# Patient Record
Sex: MALE | Race: WHITE | URBAN - METROPOLITAN AREA
[De-identification: names, ages, dates, MRNs, and addresses within clinical notes are randomized per-mention and may not be internally consistent; named-entity substitution may affect disease eponyms.]

---

## 2017-04-21 ENCOUNTER — THERAPY VISIT (OUTPATIENT)
Dept: PHYSICAL THERAPY | Facility: CLINIC | Age: 47
End: 2017-04-21
Payer: COMMERCIAL

## 2017-04-21 ENCOUNTER — OFFICE VISIT (OUTPATIENT)
Dept: ORTHOPEDICS | Facility: CLINIC | Age: 47
End: 2017-04-21
Payer: COMMERCIAL

## 2017-04-21 VITALS
HEIGHT: 75 IN | DIASTOLIC BLOOD PRESSURE: 79 MMHG | BODY MASS INDEX: 22.75 KG/M2 | SYSTOLIC BLOOD PRESSURE: 120 MMHG | WEIGHT: 183 LBS

## 2017-04-21 DIAGNOSIS — S39.011A ABDOMINAL MUSCLE STRAIN, INITIAL ENCOUNTER: Primary | ICD-10-CM

## 2017-04-21 DIAGNOSIS — S30.1XXA HEMATOMA OF GROIN, INITIAL ENCOUNTER: ICD-10-CM

## 2017-04-21 DIAGNOSIS — S76.219A GROIN STRAIN: Primary | ICD-10-CM

## 2017-04-21 DIAGNOSIS — S39.011A ABDOMINAL MUSCLE STRAIN: ICD-10-CM

## 2017-04-21 PROCEDURE — 99203 OFFICE O/P NEW LOW 30 MIN: CPT | Performed by: FAMILY MEDICINE

## 2017-04-21 PROCEDURE — 97161 PT EVAL LOW COMPLEX 20 MIN: CPT | Mod: GP | Performed by: PHYSICAL THERAPIST

## 2017-04-21 RX ORDER — TADALAFIL 5 MG/1
5 TABLET ORAL
COMMUNITY
Start: 2016-12-28

## 2017-04-21 NOTE — PROGRESS NOTES
[facility]   Clinic Visit s Apr 21, 2017    PCP: No primary care provider on file.      Anil is a 46 year old male who is seen in consultation at the request of Freeman Bates for   Chief Complaint   Patient presents with     Musculoskeletal Problem     lower abdominal/groin pain     Injury: he was jumping up for a basketball on 4/17/17 and felt a sudden pain and electrical sensation in the groin and testicular region    Location of Pain: bilateral groin and lower abdominal pain, worse on the left  Duration of Pain: 4 day(s)  Rating of Pain at worst: 10/10  Rating of Pain Currently: 2/10  Pain is worse with: getting in and out of bed, coughing, sneezing  Pain is better with: walking  Treatment so far consists of: ice, ibuprofen and chiropractic care   Associated symptoms: + swelling and bruising in the groin, penis and testes  Prior History of related problems: testicular cancer- left testicle removed (had follow up CT this past march and is cancer free), previous surgeries to help have children and vasectomy     Social History: works as a consultant and also teaches business consulting at [employer]     Past Medical History:   Diagnosis Date     Herpes simplex without mention of complication      Testicular cancer (H)      Unspecified sinusitis (chronic)        Patient Active Problem List    Diagnosis Date Noted     Pain in joint, ankle and foot 02/27/2015     Priority: Medium     CARDIOVASCULAR SCREENING; LDL GOAL LESS THAN 160 10/31/2010     Priority: Medium     Herpes simplex virus (HSV) infection      Priority: Medium     Problem list name updated by automated process. Provider to review       FMHX denies sig illnesses.      Social History     Social History     Marital status:      Spouse name: N/A     Number of children: N/A     Years of education: N/A     Social History Main Topics     Smoking status: Never Smoker     Smokeless tobacco: None     Alcohol use Yes  "     Comment: 6-7 beers / week     Past Surgical History:   Procedure Laterality Date     ORCHIECTOMY SCROTAL Left      SURGICAL HISTORY OF -       s/p excision vericose seal on groin     SURGICAL HISTORY OF -       cleft palate repair     VASECTOMY         Review of Systems   Musculoskeletal: Positive for joint pain.   All other systems reviewed and are negative.        Physical Exam   Abdominal: Soft. Bowel sounds are normal. He exhibits mass. He exhibits no distension. There is tenderness. There is no rebound and no guarding.   Genitourinary: Penis normal.   Genitourinary Comments: Diffuse scrotal bruising, no scrotal tenderness to palpation or masses.    Golf ball sized hematomas present just above pubic symphysis bilaterally, + tenderness to palpation , bruising present, no induration, purulence, unusual warmth   Musculoskeletal:        Right hip: Normal.        Left hip: Normal.     /79 (BP Location: Left arm, Patient Position: Chair, Cuff Size: Adult Regular)  Ht 6' 2.5\" (1.892 m)  Wt 183 lb (83 kg)  BMI 23.18 kg/m2  Constitutional:well-developed, well-nourished, and in no distress.   Cardiovascular: Intact distal pulses.    Neurological: alert. Gait Normal:   Gait, station, stance, and balance appear normal for age  Skin: Skin is warm and dry.   Psychiatric: Mood and affect normal.   Respiratory: unlabored, speaks in full sentences  Lymph: no LAD, no lymphangitis    Left Hip Exam   Left hip exam is normal.    Tenderness   None    Range of Motion   Normal left hip ROM    Muscle Strength   Normal left hip strength    Comments:  Mild nontender bruising into anteromedial thigh    Right Hip Exam   Right hip exam is normal.    Tenderness   None    Range of Motion   Normal right hip ROM    Muscle Strength   Normal right hip strength        ASSESSMENT/PLAN    ICD-10-CM    1. Abdominal muscle strain, initial encounter S39.011A    2. Hematoma of groin, initial encounter S30.1XXA      Acute abd strain, " probably rectus, with secondary hematomas and scrotal bruising. Advised on compression, icing, and active rest. Once pain starts to recede, brief application of heat to mobilize hematomas recommended. Cannot exclude possible sports hernia (tear of transversalis fascia) as a consequence, recheck in 2-3 weeks as needed.

## 2017-04-21 NOTE — PROGRESS NOTES
Subjective:    Patient is a 46 year old male presenting with rehab right hip hpi and rehab left hip hpi.   Anil Yo is a 46 year old male with a bilateral hips (L>R) condition.  Condition occurred with:  A jump.  Condition occurred: during recreation/sport.  This is a new condition  4/17/17.    Site of Pain: groin, adductors, lower rectus femoris.    Pain is described as sharp and burning and is constant and reported as 5/10.  Associated symptoms:  Edema, loss of motion/stiffness and loss of strength. Pain is the same all the time.  Symptoms are exacerbated by activity, bending/squatting, standing and running and relieved by rest.  Since onset symptoms are unchanged.        General health as reported by patient is good.  Pertinent medical history includes:  Cancer (testicular cancer, currently is stable).  Medical allergies: yes (CT dye, z-pack).  Other surgeries include:  Cancer surgery.    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  Primary job tasks include:  Prolonged sitting.    Barriers include:  None as reported by the patient.    Red flags:  None as reported by the patient.                        Objective:    System                                           Hip Evaluation  HIP AROM:  : limited due to pain.                  Hip PROM:  : limited due to pain.                          Hip Strength:  : Not tested due to acute injury and pain, adductors 3/5.                            Hip Palpation:  Palpations normal left hip: very tender at lower rectus femoris and around genital area.  Left hip tenderness present at:   Adductors and Pubis  Right hip tenderness present at:  Adductors and Pubis                 General Evaluation:                Edema:    Significant findings:  Bilateral edema proximal to the scrotum (Left > Right)                                                   ROS    Assessment/Plan:      Patient is a 46 year old male with  bilateral groin/adductor complaints.    Patient has the following significant findings with corresponding treatment plan.                Diagnosis 1:  Bilateral adductor strain  Pain -  hot/cold therapy, electric stimulation, manual therapy, self management, education and home program  Decreased ROM/flexibility - manual therapy, therapeutic exercise and home program  Decreased strength - therapeutic exercise, therapeutic activities and home program  Edema - electric stimulation, cold therapy and self management/home program      Therapy Evaluation Codes:   1) History comprised of:   Personal factors that impact the plan of care:      None.    Comorbidity factors that impact the plan of care are:      Cancer.     Medications impacting care: None.  2) Examination of Body Systems comprised of:   Body structures and functions that impact the plan of care:      Bilateral adductors, groin.   Activity limitations that impact the plan of care are:      Jumping, Running, Sports, Squatting/kneeling, Stairs and Standing.  3) Clinical presentation characteristics are:   Stable/Uncomplicated.  4) Decision-Making    Low complexity using standardized patient assessment instrument and/or measureable assessment of functional outcome.  Cumulative Therapy Evaluation is: Low complexity.    Previous and current functional limitations:  (See Goal Flow Sheet for this information)    Short term and Long term goals: (See Goal Flow Sheet for this information)     Communication ability:  Patient appears to be able to clearly communicate and understand verbal and written communication and follow directions correctly.  Treatment Explanation - The following has been discussed with the patient:   RX ordered/plan of care  Anticipated outcomes  Possible risks and side effects  This patient would benefit from PT intervention to resume normal activities.   Rehab potential is good.    Frequency:  1 X week, once daily  Duration:  for 8 weeks  Discharge  Plan:  Achieve all LTG.  Independent in home treatment program.  Reach maximal therapeutic benefit.    Please refer to the daily flowsheet for treatment today, total treatment time and time spent performing 1:1 timed codes.

## 2017-04-21 NOTE — MR AVS SNAPSHOT
After Visit Summary   4/21/2017    Anil Yo    MRN: [id number]           Patient Information     Date Of Birth          1970        Visit Information        Provider Department      4/21/2017 9:20 AM Sam Bates PT [facility]        Today's Diagnoses     Groin strain    -  1    Abdominal muscle strain           Follow-ups after your visit        Who to contact     If you have questions or need follow up information about today's clinic visit or your schedule please contact [facility] directly at [phone].  Normal or non-critical lab and imaging results will be communicated to you by Transporeonhart, letter or phone within 4 business days after the clinic has received the results. If you do not hear from us within 7 days, please contact the clinic through Voradiust or phone. If you have a critical or abnormal lab result, we will notify you by phone as soon as possible.  Submit refill requests through NativeAD or call your pharmacy and they will forward the refill request to us. Please allow 3 business days for your refill to be completed.          Additional Information About Your Visit        MyChart Information     NativeAD gives you secure access to your electronic health record. If you see a primary care provider, you can also send messages to your care team and make appointments. If you have questions, please call your primary care clinic.  If you do not have a primary care provider, please call [phone] and they will assist you.        Care EveryWhere ID     This is your Care EveryWhere ID. This could be used by other organizations to access your [place] medical records  [id number]         Blood Pressure from Last 3 Encounters:   04/21/17 120/79   12/19/08 115/65   03/04/08 105/60    Weight from Last 3 Encounters:   04/21/17 83 kg (183 lb)   12/19/08 81.6 kg (180 lb)   03/04/08  82.3 kg (181 lb 6.4 oz)              We Performed the Following     HC PT EVAL, LOW COMPLEXITY     ROLY INITIAL EVAL REPORT        Primary Care Provider    None Specified       No primary provider on file.        Thank you!     Thank you for choosing [facility]  for your care. Our goal is always to provide you with excellent care. Hearing back from our patients is one way we can continue to improve our services. Please take a few minutes to complete the written survey that you may receive in the mail after your visit with us. Thank you!             Your Updated Medication List - Protect others around you: Learn how to safely use, store and throw away your medicines at www.disposemymeds.org.          This list is accurate as of: 4/21/17  3:46 PM.  Always use your most recent med list.                   Brand Name Dispense Instructions for use    CENTRUM Tabs      Reported on 4/21/2017       CIALIS 5 MG tablet   Generic drug:  tadalafil      Take 5 mg by mouth       MEDROL (QUE) 4 MG tablet   Generic drug:  methylPREDNISolone     21    AS DIRECTED       VALTREX 1000 mg tablet   Generic drug:  valACYclovir     16    1 TABLET 3 TIMES DAILY       VIAGRA 100 MG cap/tab   Generic drug:  sildenafil     12    1/2 - 1 TABLET TAKEN AT LEAST 30 MINUTES BEFORE INTERCOURSE       VICODIN 5-500 MG per tablet   Generic drug:  HYDROcodone-acetaminophen     15    ONE TO TWO TABLETS EVERY 4 TO 6 HOURS AS NEEDED FOR PAIN       ZITHROMAX Z- MG Caps     6    2 tabs p.o day#1 then 1 tab p.o. qd x 4days

## 2017-04-21 NOTE — LETTER
[facility] [place] [id number] [facility] [address] [facility]  [address]  [facility] [place] [facility] [address]  [phone]    2017    Re: Anil oY   :   1970  MRN:  [id number]   REFERRING PHYSICIAN:   Trenton Williamson    [facility]  Date of Initial Evaluation:     17  Visits:  Rxs Used: 1  Reason for Referral:     Groin strain  Abdominal muscle strain    EVALUATION SUMMARY  Subjective:  Patient is a 46 year old male presenting with rehab right hip hpi and rehab left hip hpi.   Anil Yo is a 46 year old male with a bilateral hips (L>R) condition.  Condition occurred with:  A jump.  Condition occurred: during recreation/sport.  This is a new condition  17.    Site of Pain: groin, adductors, lower rectus femoris.    Pain is described as sharp and burning and is constant and reported as 5/10.  Associated symptoms:  Edema, loss of motion/stiffness and loss of strength. Pain is the same all the time.  Symptoms are exacerbated by activity, bending/squatting, standing and running and relieved by rest.  Since onset symptoms are unchanged.        General health as reported by patient is good.  Pertinent medical history includes:  Cancer (testicular cancer, currently is stable). Medical allergies: yes (CT dye, z-pack).  Other surgeries include:  Cancer surgery.    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  Primary job tasks include:  Prolonged sitting.  Barriers include:  None as reported by the patient.  Red flags:  None as reported by the patient.    Objective:  Hip Evaluation  HIP AROM:  : limited due to pain.  Hip PROM:  : limited due to pain.  Hip Strength:  : Not tested due to acute injury and pain, adductors 3/5.  Hip Palpation:  Palpations normal left hip: very tender at lower rectus femoris and around genital area.  Left hip tenderness present at:   Adductors  and Pubis  Right hip tenderness present at:  Adductors and Pubis         General Evaluation:  Edema:    Significant findings:  Bilateral edema proximal to the scrotum (Left > Right)    Re: Anil Yo   :   1970    Assessment/Plan:    Patient is a 46 year old male with bilateral groin/adductor complaints.    Patient has the following significant findings with corresponding treatment plan.                Diagnosis 1:  Bilateral adductor strain  Pain -  hot/cold therapy, electric stimulation, manual therapy, self management, education and home program  Decreased ROM/flexibility - manual therapy, therapeutic exercise and home program  Decreased strength - therapeutic exercise, therapeutic activities and home program  Edema - electric stimulation, cold therapy and self management/home program      Therapy Evaluation Codes:   1) History comprised of:   Personal factors that impact the plan of care:      None.    Comorbidity factors that impact the plan of care are:      Cancer.     Medications impacting care: None.  2) Examination of Body Systems comprised of:   Body structures and functions that impact the plan of care:      Bilateral adductors, groin.   Activity limitations that impact the plan of care are:      Jumping, Running, Sports, Squatting/kneeling, Stairs and Standing.  3) Clinical presentation characteristics are:   Stable/Uncomplicated.  4) Decision-Making    Low complexity using standardized patient assessment instrument and/or measureable assessment of functional outcome.  Cumulative Therapy Evaluation is: Low complexity.    Previous and current functional limitations:  (See Goal Flow Sheet for this information)    Short term and Long term goals: (See Goal Flow Sheet for this information)     Communication ability:  Patient appears to be able to clearly communicate and understand verbal and written communication and follow directions correctly.  Treatment Explanation - The following has been  discussed with the patient:   RX ordered/plan of care  Anticipated outcomes  Possible risks and side effects  This patient would benefit from PT intervention to resume normal activities.   Rehab potential is good.    Frequency:  1 X week, once daily  Duration:  for 8 weeks  Discharge Plan:  Achieve all LTG.  Independent in home treatment program.  Reach maximal therapeutic benefit.    Re: Anil Yo   :   1970      Thank you for your referral.    INQUIRIES  Therapist:    Sam Bates, PT  [facility]  [address] [place] [address]  [address]  [place] [address]  Phone: [phone]  Fax: [phone]

## 2017-04-21 NOTE — MR AVS SNAPSHOT
"              After Visit Summary   4/21/2017    Anil Yo    MRN: [id number]           Patient Information     Date Of Birth          1970        Visit Information        Provider Department      4/21/2017 10:20 AM Allen Villarreal MD [facility]        Today's Diagnoses     Abdominal muscle strain, initial encounter    -  1    Hematoma of groin, initial encounter           Follow-ups after your visit        Who to contact     If you have questions or need follow up information about today's clinic visit or your schedule please contact [facility] directly at [phone].  Normal or non-critical lab and imaging results will be communicated to you by MyChart, letter or phone within 4 business days after the clinic has received the results. If you do not hear from us within 7 days, please contact the clinic through P[employer] Pharm[employer]shart or phone. If you have a critical or abnormal lab result, we will notify you by phone as soon as possible.  Submit refill requests through Sedicii or call your pharmacy and they will forward the refill request to us. Please allow 3 business days for your refill to be completed.          Additional Information About Your Visit        MyChart Information     Sedicii gives you secure access to your electronic health record. If you see a primary care provider, you can also send messages to your care team and make appointments. If you have questions, please call your primary care clinic.  If you do not have a primary care provider, please call [phone] and they will assist you.        Care EveryWhere ID     This is your Care EveryWhere ID. This could be used by other organizations to access your [place] medical records  [id number]        Your Vitals Were     Height BMI (Body Mass Index)                6' 2.5\" (1.892 m) 23.18 kg/m2           Blood Pressure from Last 3 Encounters: "   04/21/17 120/79   12/19/08 115/65   03/04/08 105/60    Weight from Last 3 Encounters:   04/21/17 183 lb (83 kg)   12/19/08 180 lb (81.6 kg)   03/04/08 181 lb 6.4 oz (82.3 kg)              Today, you had the following     No orders found for display       Primary Care Provider    None Specified       No primary provider on file.        Thank you!     Thank you for choosing [facility]  for your care. Our goal is always to provide you with excellent care. Hearing back from our patients is one way we can continue to improve our services. Please take a few minutes to complete the written survey that you may receive in the mail after your visit with us. Thank you!             Your Updated Medication List - Protect others around you: Learn how to safely use, store and throw away your medicines at www.disposemymeds.org.          This list is accurate as of: 4/21/17 11:59 PM.  Always use your most recent med list.                   Brand Name Dispense Instructions for use    CENTRUM Tabs      Reported on 4/21/2017       CIALIS 5 MG tablet   Generic drug:  tadalafil      Take 5 mg by mouth       MEDROL (QUE) 4 MG tablet   Generic drug:  methylPREDNISolone     21    AS DIRECTED       VALTREX 1000 mg tablet   Generic drug:  valACYclovir     16    1 TABLET 3 TIMES DAILY       VIAGRA 100 MG cap/tab   Generic drug:  sildenafil     12    1/2 - 1 TABLET TAKEN AT LEAST 30 MINUTES BEFORE INTERCOURSE       VICODIN 5-500 MG per tablet   Generic drug:  HYDROcodone-acetaminophen     15    ONE TO TWO TABLETS EVERY 4 TO 6 HOURS AS NEEDED FOR PAIN       ZITHROMAX Z- MG Caps     6    2 tabs p.o day#1 then 1 tab p.o. qd x 4days

## 2017-04-21 NOTE — LETTER
[facility]  [address] [place] [address], [address]  [place] [address]  Main: [phone]  Fax: [phone]      April 24, 2017      RE:  Anil Yo  1970      Dear Anil Laboy was seen at [facility] for evaluation of   1. Abdominal muscle strain, initial encounter    2. Hematoma of groin, initial encounter       Please review the enclosed visit summary.    If you have any questions or concerns, please call the clinic.      Sincerely,    Allen Villarreal MD

## 2017-04-24 PROBLEM — S30.1XXA HEMATOMA OF GROIN, INITIAL ENCOUNTER: Status: ACTIVE | Noted: 2017-04-24

## 2017-05-15 ENCOUNTER — OFFICE VISIT (OUTPATIENT)
Dept: ORTHOPEDICS | Facility: CLINIC | Age: 47
End: 2017-05-15
Payer: COMMERCIAL

## 2017-05-15 VITALS
WEIGHT: 184 LBS | SYSTOLIC BLOOD PRESSURE: 108 MMHG | DIASTOLIC BLOOD PRESSURE: 66 MMHG | HEIGHT: 74 IN | BODY MASS INDEX: 23.61 KG/M2

## 2017-05-15 DIAGNOSIS — S76.219D GROIN STRAIN, UNSPECIFIED LATERALITY, SUBSEQUENT ENCOUNTER: ICD-10-CM

## 2017-05-15 DIAGNOSIS — S39.011D ABDOMINAL MUSCLE STRAIN, SUBSEQUENT ENCOUNTER: Primary | ICD-10-CM

## 2017-05-15 DIAGNOSIS — S30.1XXA HEMATOMA OF GROIN, INITIAL ENCOUNTER: ICD-10-CM

## 2017-05-15 PROCEDURE — 99213 OFFICE O/P EST LOW 20 MIN: CPT | Performed by: FAMILY MEDICINE

## 2017-05-15 NOTE — Clinical Note
Anil is coming back to see you to work on hip and groin stiffness following his injury. RIGHT is much worse than LEFT in terms of stiffness, did not do xr but with no antecedent pain, djd seems unlikely.

## 2017-05-15 NOTE — PROGRESS NOTES
[facility]   Follow-up Visit s May 15, 2017    PCP: No primary care provider on file.      Subjective:  Anil is a 46 year old male who is seen in follow up for evaluation of No chief complaint on file.    His last visit was on 4/21/2017.  Since that time, symptoms have been improving. He does have some pain with side to side movements, including cutting and rotational movements, sexual intercourse. John to jog and bike with no trouble. Not too much trouble with sit ups or pushups.     Patient's past medical, surgical, social and family histories are reviewed today.      Injury: he was jumping up for a basketball on 4/17/17 and felt a sudden pain and electrical sensation in the groin and testicular region    Location of Pain: bilateral groin and lower abdominal pain, worse on the left    Prior History of related problems: testicular cancer- left testicle removed (had follow up CT this past march and is cancer free), previous surgeries to help have children and vasectomy     Social History: works as a consultant and also teaches business consulting at [employer]     Past Medical History:   Diagnosis Date     Herpes simplex without mention of complication      Testicular cancer (H)      Unspecified sinusitis (chronic)        Patient Active Problem List    Diagnosis Date Noted     Hematoma of groin, initial encounter 04/24/2017     Priority: Medium     Groin strain 04/21/2017     Priority: Medium     Abdominal muscle strain 04/21/2017     Priority: Medium     Pain in joint, ankle and foot 02/27/2015     Priority: Medium     CARDIOVASCULAR SCREENING; LDL GOAL LESS THAN 160 10/31/2010     Priority: Medium     Herpes simplex virus (HSV) infection      Priority: Medium     Problem list name updated by automated process. Provider to review       FMHX denies sig illnesses.      Social History     Social History     Marital status:      Spouse name: N/A     Number of children:  "N/A     Years of education: N/A     Social History Main Topics     Smoking status: Never Smoker     Smokeless tobacco: None     Alcohol use Yes      Comment: 6-7 beers / week     Past Surgical History:   Procedure Laterality Date     ORCHIECTOMY SCROTAL Left      SURGICAL HISTORY OF -       s/p excision vericose seal on groin     SURGICAL HISTORY OF -       cleft palate repair     VASECTOMY         Review of Systems   Musculoskeletal: Positive for joint pain.   All other systems reviewed and are negative.        Physical Exam     /66  Ht 6' 2\" (1.88 m)  Wt 184 lb (83.5 kg)  BMI 23.62 kg/m2  Constitutional:well-developed, well-nourished, and in no distress.   Cardiovascular: Intact distal pulses.    Neurological: alert. Gait Normal:   Gait, station, stance, and balance appear normal for age  Skin: Skin is warm and dry.   Psychiatric: Mood and affect normal.   Respiratory: unlabored, speaks in full sentences  Lymph: no LAD, no lymphangitis    Left Hip Exam   Gait: Normal.    Tenderness   None    Range of Motion   Extension:            Normal  Flexion:                 Normal  Internal Rotation:  Abnormal  External Rotation: Normal  Abduction:            Normal  Adduction:            Normal    Muscle Strength   Normal left hip strength  Abduction:  5/5  Adduction:  5/5  Flexion:      5/5    Tests   Thomas: Negative  Giovani:  N/t    Comments:  Mild nontender bruising into anteromedial thigh, much improved.    Mild pain with IR    Right Hip Exam   Gait: Normal.    Tenderness   None    Range of Motion   Normal right hip ROM  Extension:            Normal  Flexion:                 Normal  Internal Rotation:  20  External Rotation: Normal  Abduction:            Normal  Adduction:            Normal    Muscle Strength   Normal right hip strength  Abduction:  5/5  Adduction:  5/5  Flexion:      5/5    Tests   Thomas: Positive  Giovani:  N/t    Comments:  Abnormal Thomas for limited ER        ASSESSMENT/PLAN    ICD-10-CM    1. " Abdominal muscle strain, subsequent encounter S39.011D [facility] REF[facility]   2. Hematoma of groin, initial encounter S30.1XXA [facility], [facility] REFERRAL   3. Groin strain, unspecified laterality, subsequent encounter S76.219D [facility], [facility] CHIR[facility]RACTIC REFERRAL     Resolving groin/lower abdominal strain, pain more localized to hip adductors now than lower abdominal area. Significantly increased tightness with RIGHT hip ROM in ER compared to LEFT .     Reassured, doubtful sports hernia, but if pain plateaus, this will need to be reconsidered.     Recommended PHYSICAL THERAPY to work on ROM and hip adductor strengthening. Pt eager to proceed.

## 2017-05-15 NOTE — NURSING NOTE
"No chief complaint on file.      Initial /66  Ht 6' 2\" (1.88 m)  Wt 184 lb (83.5 kg)  BMI 23.62 kg/m2 Estimated body mass index is 23.62 kg/(m^2) as calculated from the following:    Height as of this encounter: 6' 2\" (1.88 m).    Weight as of this encounter: 184 lb (83.5 kg).  Medication Reconciliation: complete     Lenora Vazquez, ATC    "

## 2017-05-15 NOTE — MR AVS SNAPSHOT
After Visit Summary   5/15/2017    Anil Yo    MRN: [id number]           Patient Information     Date Of Birth          1970        Visit Information        Provider Department      5/15/2017 3:00 PM Allen Villarreal MD [facility]        Today's Diagnoses     Abdominal muscle strain, subsequent encounter    -  1    Hematoma of groin, initial encounter        Groin strain, unspecified laterality, subsequent encounter           Follow-ups after your visit        Additional Services     [facility] HAND, AND CHIR[facility]RACTIC REFERRAL       **This order will print in the [facility] Scheduling Office**    Physical Therapy, Hand Therapy and Chiropractic Care are available through:    *[facility]  *[facility]  *[facility]    Call one number to schedule at any of the above locations: [phone].    Your provider has referred you to: Physical Therapy at [facility] or [facility] [place]    Indication/Reason for Referral:    Abdominal muscle strain, subsequent encounter  Hematoma of groin, initial encounter  Groin strain, unspecified laterality, subsequent encounter    Onset of Illness:   Therapy Orders: Evaluate and Treat  Special Programs: None  Special Request: Donald Viera      Additional Comments for the Therapist or Chiropractor:     Please be aware that coverage of these services is subject to the terms and limitations of your health insurance plan.  Call member services at your health plan with any benefit or coverage questions.      Please bring the following to your appointment:    *Your personal calendar for scheduling future appointments  *Comfortable clothing                  Your next 10 appointments already scheduled     May 23, 2017  8:40 AM CDT   [facility] Extremity with Sam Bates PT   [facility] ([facility])    [address] [place] [address]   [id number]  [place] [address]   [phone]              Who to contact     If you have questions or need follow up information about today's clinic visit or your schedule please contact [facility] directly at [phone].  Normal or non-critical lab and imaging results will be communicated to you by MyChart, letter or phone within 4 business days after the clinic has received the results. If you do not hear from us within 7 days, please contact the clinic through [email or web address] or phone. If you have a critical or abnormal lab result, we will notify you by phone as soon as possible.  Submit refill requests through [employer] or call your pharmacy and they will forward the refill request to us. Please allow 3 business days for your refill to be completed.          Additional Information About Your Visit        [email or web address] Information     [employer] gives you secure access to your electronic health record. If you see a primary care provider, you can also send messages to your care team and make appointments. If you have questions, please call your primary care clinic.  If you do not have a primary care provider, please call [phone] and they will assist you.        Care EveryWhere ID     This is your Care EveryWhere ID. This could be used by other organizations to access your [facility] medical records  [id number]        Your Vitals Were     Height BMI (Body Mass Index)                6' 2\" (1.88 m) 23.62 kg/m2           Blood Pressure from Last 3 Encounters:   05/15/17 108/66   04/21/17 120/79   12/19/08 115/65    Weight from Last 3 Encounters:   05/15/17 184 lb (83.5 kg)   04/21/17 183 lb (83 kg)   12/19/08 180 lb (81.6 kg)              We Performed the Following     [facility] HAND, AND CHIROPRACTIC REFERRAL        Primary Care Provider    None Specified       No primary provider on file.        Thank you!     Thank you for choosing [facility]  for your care. Our goal is always to provide you with excellent care. Hearing back from our patients is one way we can continue to improve our services. Please take a few minutes to complete the written survey that you may receive in the mail after your visit with us. Thank you!             Your Updated Medication List - Protect others around you: Learn how to safely use, store and throw away your medicines at www.disposemymeds.org.          This list is accurate as of: 5/15/17  3:53 PM.  Always use your most recent med list.                   Brand Name Dispense Instructions for use    CENTRUM Tabs      Reported on 5/15/2017       CIALIS 5 MG tablet   Generic drug:  tadalafil      Take 5 mg by mouth       VALTREX 1000 mg tablet   Generic drug:  valACYclovir     16    1 TABLET 3 TIMES DAILY       VIAGRA 100 MG cap/tab   Generic drug:  sildenafil     12    1/2 - 1 TABLET TAKEN AT LEAST 30 MINUTES BEFORE INTERCOURSE       VICODIN 5-500 MG per tablet   Generic drug:  HYDROcodone-acetaminophen     15    ONE TO TWO TABLETS EVERY 4 TO 6 HOURS AS NEEDED FOR PAIN       ZITHROMAX Z- MG Caps     6    2 tabs p.o day#1 then 1 tab p.o. qd x 4days

## 2017-05-23 ENCOUNTER — THERAPY VISIT (OUTPATIENT)
Dept: PHYSICAL THERAPY | Facility: CLINIC | Age: 47
End: 2017-05-23
Payer: COMMERCIAL

## 2017-05-23 DIAGNOSIS — S76.219D GROIN STRAIN, UNSPECIFIED LATERALITY, SUBSEQUENT ENCOUNTER: ICD-10-CM

## 2017-05-23 DIAGNOSIS — S39.011D ABDOMINAL MUSCLE STRAIN, SUBSEQUENT ENCOUNTER: ICD-10-CM

## 2017-05-23 PROCEDURE — 97110 THERAPEUTIC EXERCISES: CPT | Mod: GP | Performed by: PHYSICAL THERAPIST

## 2017-05-23 PROCEDURE — 97112 NEUROMUSCULAR REEDUCATION: CPT | Mod: GP | Performed by: PHYSICAL THERAPIST

## 2019-10-04 ENCOUNTER — HEALTH MAINTENANCE LETTER (OUTPATIENT)
Age: 49
End: 2019-10-04

## 2020-11-07 ENCOUNTER — HEALTH MAINTENANCE LETTER (OUTPATIENT)
Age: 50
End: 2020-11-07

## 2021-09-11 ENCOUNTER — HEALTH MAINTENANCE LETTER (OUTPATIENT)
Age: 51
End: 2021-09-11

## 2022-01-01 ENCOUNTER — HEALTH MAINTENANCE LETTER (OUTPATIENT)
Age: 52
End: 2022-01-01

## 2022-10-29 ENCOUNTER — HEALTH MAINTENANCE LETTER (OUTPATIENT)
Age: 52
End: 2022-10-29

## 2023-04-08 ENCOUNTER — HEALTH MAINTENANCE LETTER (OUTPATIENT)
Age: 53
End: 2023-04-08

## 2024-06-08 ENCOUNTER — HEALTH MAINTENANCE LETTER (OUTPATIENT)
Age: 54
End: 2024-06-08